# Patient Record
Sex: MALE | Race: WHITE
[De-identification: names, ages, dates, MRNs, and addresses within clinical notes are randomized per-mention and may not be internally consistent; named-entity substitution may affect disease eponyms.]

---

## 2020-03-10 ENCOUNTER — HOSPITAL ENCOUNTER (EMERGENCY)
Dept: HOSPITAL 54 - ER | Age: 65
Discharge: HOME | End: 2020-03-10
Payer: COMMERCIAL

## 2020-03-10 VITALS — WEIGHT: 180 LBS | BODY MASS INDEX: 29.99 KG/M2 | HEIGHT: 65 IN

## 2020-03-10 VITALS — DIASTOLIC BLOOD PRESSURE: 102 MMHG | SYSTOLIC BLOOD PRESSURE: 165 MMHG

## 2020-03-10 DIAGNOSIS — R10.11: ICD-10-CM

## 2020-03-10 DIAGNOSIS — Z98.890: ICD-10-CM

## 2020-03-10 DIAGNOSIS — R10.13: Primary | ICD-10-CM

## 2020-03-10 LAB
ALBUMIN SERPL BCP-MCNC: 3.7 G/DL (ref 3.4–5)
ALP SERPL-CCNC: 73 U/L (ref 46–116)
ALT SERPL W P-5'-P-CCNC: 17 U/L (ref 12–78)
AST SERPL W P-5'-P-CCNC: 17 U/L (ref 15–37)
BASOPHILS # BLD AUTO: 0.1 /CMM (ref 0–0.2)
BASOPHILS NFR BLD AUTO: 0.8 % (ref 0–2)
BILIRUB DIRECT SERPL-MCNC: 0.1 MG/DL (ref 0–0.2)
BILIRUB SERPL-MCNC: 0.3 MG/DL (ref 0.2–1)
BUN SERPL-MCNC: 14 MG/DL (ref 7–18)
CALCIUM SERPL-MCNC: 9.4 MG/DL (ref 8.5–10.1)
CHLORIDE SERPL-SCNC: 107 MMOL/L (ref 98–107)
CO2 SERPL-SCNC: 28 MMOL/L (ref 21–32)
CREAT SERPL-MCNC: 1 MG/DL (ref 0.6–1.3)
EOSINOPHIL NFR BLD AUTO: 1.6 % (ref 0–6)
GLUCOSE SERPL-MCNC: 126 MG/DL (ref 74–106)
HCT VFR BLD AUTO: 43 % (ref 39–51)
HGB BLD-MCNC: 14.6 G/DL (ref 13.5–17.5)
LIPASE SERPL-CCNC: 180 U/L (ref 73–393)
LYMPHOCYTES NFR BLD AUTO: 2.7 /CMM (ref 0.8–4.8)
LYMPHOCYTES NFR BLD AUTO: 34.6 % (ref 20–44)
MCHC RBC AUTO-ENTMCNC: 34 G/DL (ref 31–36)
MCV RBC AUTO: 84 FL (ref 80–96)
MONOCYTES NFR BLD AUTO: 0.7 /CMM (ref 0.1–1.3)
MONOCYTES NFR BLD AUTO: 8.6 % (ref 2–12)
NEUTROPHILS # BLD AUTO: 4.3 /CMM (ref 1.8–8.9)
NEUTROPHILS NFR BLD AUTO: 54.4 % (ref 43–81)
PLATELET # BLD AUTO: 259 /CMM (ref 150–450)
POTASSIUM SERPL-SCNC: 3.8 MMOL/L (ref 3.5–5.1)
PROT SERPL-MCNC: 7.5 G/DL (ref 6.4–8.2)
RBC # BLD AUTO: 5.17 MIL/UL (ref 4.5–6)
SODIUM SERPL-SCNC: 144 MMOL/L (ref 136–145)
WBC NRBC COR # BLD AUTO: 7.9 K/UL (ref 4.3–11)

## 2020-03-10 NOTE — NUR
PATIENT A/OX4, AMBULATORY WITH STEADY GAIT, DENIES PAIN. NO DISTRESS NOTED. IV 
removed. Catheter intact and site benign. Pressure and 4x4 applied to site. No 
bleeding noted.Patient discharged to home in stable condition. Written and 
verbal after care instructions given. Patient verbalizes understanding of 
instruction.

## 2020-03-10 NOTE — NUR
DIFFUSE ABDOMINAL PAIN W/ NAUSEA SINCE ARRIVING FROM [facility], 03/01/20. PATIENT 
A/OX4, AMBULATORY WITH STEADY GAIT. NO DISTRESS NOTED, KEPT COMFORTABLE.